# Patient Record
(demographics unavailable — no encounter records)

---

## 2024-10-17 NOTE — REASON FOR VISIT
[Follow - Up] : a follow-up visit [DM Type 2] : DM Type 2 [Thyroid nodule/ MNG] : thyroid nodule/ MNG [Hyperparathyroidism] : hyperparathyroidism

## 2024-10-17 NOTE — PHYSICAL EXAM
[Alert] : alert [Well Nourished] : well nourished [Healthy Appearance] : healthy appearance [No Acute Distress] : no acute distress [Well Developed] : well developed [Normal Voice/Communication] : normal voice communication [Normal Sclera/Conjunctiva] : normal sclera/conjunctiva [Normal Optic Discs] : normal optic discs [No Lid Lag] : no lid lag [Normal Outer Ear/Nose] : the ears and nose were normal in appearance [Normal Lips/Gums] : the lips and gums were normal [Normal Nasal Mucosa] : the nasal mucosa was normal [No Respiratory Distress] : no respiratory distress [No Accessory Muscle Use] : no accessory muscle use [Oriented x3] : oriented to person, place, and time [Normal Affect] : the affect was normal [Normal Insight/Judgement] : insight and judgment were intact [Normal Mood] : the mood was normal

## 2024-10-17 NOTE — HISTORY OF PRESENT ILLNESS
[FreeTextEntry1] : 69 [facility] patient of Dr Lau coming for f/u for thyroid nodules and type 2 diabetes  does exercise had a fall july 2023 was very hot outside per pt her sugar was "good" , has eaten before the episode, she really believes was not the sugar  Patient reports being very stressed out as she noticed her sugar in the morning around 130 and at bedtime which is 2 hours after dinner around 150s 170s which are higher her asking to restart glipizide last visit, however today her sugar 78 in the office after exercising this morning A1c much better         followed with Keira Shah her Nutritionist ,                never had diabetes classes as advised , used to weight 200lb few years ago, lost it intentionally,        on Metformin had diarrhea on and off, stays in the restrooms for hrs when it happens        type 2 DM new onset 8/2015 complicated by neuropathy symtoms         on Tradjenta 5mg qam      jardiance 25mg qam added June 2022 for high BP stopped Glipzide 2.5mg qam         Her last HgA1c on was 5.7%, glucose 322, 11/2015 Hga1c 8.8%, fasting sugar 114, HgA1c 6.0 on 4/2016         Checks BS bid no records today 94, 81, 1004 per pt  See above for the numbers patient reports         Microvascular complications:         Nephropathy Cr 0.53, Cr 0.01, EGFR        Neuropathy symptoms, yes               Retinopathy denies last eye exam Dr Giang 5/19, has cataract surgery done one side, coming the other side had COVID today        Macrovascular complications:         HTN high today was high last visit too , not on meds        CAD/CVA denies         Lipids at goal on Atorvastatin 10mg started with PCP , runned out of meds, restarted again with Dr Lau, after labwork with LFT's normal         TG ab and TPO ab negative        FT4 1.72 (<1.77) FT3 2.9 (2.0-4.4)        TSH 0.43        Vit D 31        thyroid US 8/2015 done by Dr Lau after labs showing hyperthyroidism, repeated TFTs normal         right of the isthmus hypoechoic nodule 6.8mm        right lower pole 7.8mm and 9.4mm, mid pole 5mm        left lobe 8.3mm, midpole nodule 7.5mm, upper pole nodule 5.8mm and 7.6mm        superior left 1,7cm presumed lymph node         had 2/16/17 with Dr Anthony for lymph node FNA and neck lymph node showed small lymphoid cells negative for nonHodgkin lymphoma , elevated CD4/CT8 T cell ratio could be reactive         denies FHX thyroid cancer        denies neck irradiation        denies dysphagia        denies dyspnea        denies dysphonia        mildly elevated iPTH today       DEXA scan normal         denies h/o Fractures        denies h/o kidney stones.  had FNA left nodule and isthmus nodule benign 9/2019 4/11/24 follow up-  We had discontinued her on glipizide last time due to low blood sugar. Was getting sugars between 50-69 at around 3 am per CGM. This was on the lowest possible dose of glipizide. However, sugars have been higher now since coming off glipizide. Pt states her craving for sugar is still down. Takes Jardiance 25 mg. She has lost 4 lbs since October.   Still has proteinuria, but not better or worse. Kidney levels were good but she was a little dehydrated. Advised thorough hydration. Taking vitamin D supplementation and levels are better now. Parathyroid levels are also better.  Pt had a thyroid US done in late 2023 at [facility]. Waiting to review results.   7/9/24 telehealth follow up-  A1c got better.  Taking: Jardiance 25 mg qd Tradjenta 5 mg qd Rybelsus 3 mg qd Reports nausea and vomiting the first week she started Rybelsus, but since then has had no side effects. She has lost a bit of weight as well.  Still dehydrated on lab work but states she is drinking more water.   10/17/24 Patient is doing well. Weight is stable since July.

## 2024-10-17 NOTE — END OF VISIT
[FreeTextEntry3] :  I, Walker Lopez, am scribing for and in the presence of Dr. Brea Paul in the following sections: HISTORY OF PRESENT ILLNESS; REVIEW OF SYSTEMS; PHYSICAL EXAM; ASSESSMENT/ PLAN.I, Brea Paul, personally performed the services described in the documentation, reviewed the documentation recorded by the scribe in my presence, and it accurately and completely records my words and actions. 10/17/24 [Time Spent: ___ minutes] : I have spent [unfilled] minutes of time on the encounter which excludes teaching and separately reported services.

## 2025-07-21 NOTE — REVIEW OF SYSTEMS
[Recent Weight Loss (___ Lbs)] : recent weight loss: [unfilled] lbs [Negative] : Heme/Lymph [Depression] : no depression [de-identified] : Denies depression

## 2025-07-21 NOTE — END OF VISIT
[Time Spent: ___ minutes] : I have spent [unfilled] minutes of time on the encounter which excludes teaching and separately reported services. [FreeTextEntry3] :  I, Walker Lopez, am scribing for and in the presence of Dr. Brea Paul in the following sections: HISTORY OF PRESENT ILLNESS; REVIEW OF SYSTEMS; PHYSICAL EXAM; ASSESSMENT/ PLAN.I, Brea Paul, personally performed the services described in the documentation, reviewed the documentation recorded by the scribe in my presence, and it accurately and completely records my words and actions.  07/17/25

## 2025-07-21 NOTE — HISTORY OF PRESENT ILLNESS
[FreeTextEntry1] : 70 [facility] patient of Dr Lau coming for f/u for thyroid nodules and type 2 diabetes  does exercise had a fall july 2023 was very hot outside per pt her sugar was "good" , has eaten before the episode, she really believes was not the sugar  Patient reports being very stressed out as she noticed her sugar in the morning around 130 and at bedtime which is 2 hours after dinner around 150s 170s which are higher her asking to restart glipizide last visit, however today her sugar 78 in the office after exercising this morning A1c much better         followed with Keira Shah her Nutritionist ,                never had diabetes classes as advised , used to weight 200lb few years ago, lost it intentionally,        on Metformin had diarrhea on and off, stays in the restrooms for hrs when it happens        type 2 DM new onset 8/2015 complicated by neuropathy symtoms         previously on Tradjenta 5mg qam      jardiance 25mg qam added June 2022 for high BP stopped Glipzide 2.5mg qam      on Rybelsus 7 mg qd        Her last HgA1c on was 5.7%, glucose 322, 11/2015 Hga1c 8.8%, fasting sugar 114, HgA1c 6.0 on 4/2016         Checks BS bid no records today 94, 81, 1004 per pt  See above for the numbers patient reports         Microvascular complications:         Nephropathy Cr 0.53, Cr 0.01, EGFR        Neuropathy symptoms, yes               Retinopathy denies last eye exam Dr Giang 5/19, has cataract surgery done one side, coming the other side had COVID today        Macrovascular complications:         HTN high today was high last visit too , not on meds        CAD/CVA denies         Lipids at goal on Atorvastatin 10mg started with PCP , runned out of meds, restarted again with Dr Lau, after labwork with LFT's normal         TG ab and TPO ab negative        FT4 1.72 (<1.77) FT3 2.9 (2.0-4.4)        TSH 0.43        Vit D 31        thyroid US 8/2015 done by Dr Lau after labs showing hyperthyroidism, repeated TFTs normal         right of the isthmus hypoechoic nodule 6.8mm        right lower pole 7.8mm and 9.4mm, mid pole 5mm        left lobe 8.3mm, midpole nodule 7.5mm, upper pole nodule 5.8mm and 7.6mm        superior left 1,7cm presumed lymph node         had 2/16/17 with Dr Anthony for lymph node FNA and neck lymph node showed small lymphoid cells negative for nonHodgkin lymphoma , elevated CD4/CT8 T cell ratio could be reactive         denies FHX thyroid cancer        denies neck irradiation        denies dysphagia        denies dyspnea        denies dysphonia        mildly elevated iPTH today       DEXA scan normal         denies h/o Fractures        denies h/o kidney stones.  had FNA left nodule and isthmus nodule benign 9/2019 07/17/25 Patient is feeling okay. No active complaints today.

## 2025-07-21 NOTE — REVIEW OF SYSTEMS
[Recent Weight Loss (___ Lbs)] : recent weight loss: [unfilled] lbs [Negative] : Heme/Lymph [Depression] : no depression [de-identified] : Denies depression